# Patient Record
Sex: MALE | Race: WHITE | URBAN - METROPOLITAN AREA
[De-identification: names, ages, dates, MRNs, and addresses within clinical notes are randomized per-mention and may not be internally consistent; named-entity substitution may affect disease eponyms.]

---

## 2018-06-07 ENCOUNTER — PRE VISIT (OUTPATIENT)
Dept: ONCOLOGY | Facility: CLINIC | Age: 83
End: 2018-06-07

## 2018-06-07 NOTE — TELEPHONE ENCOUNTER
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

## 2018-06-11 ENCOUNTER — APPOINTMENT (OUTPATIENT)
Dept: GENERAL RADIOLOGY | Facility: CLINIC | Age: 83
DRG: 206 | End: 2018-06-11
Attending: EMERGENCY MEDICINE
Payer: MEDICARE

## 2018-06-11 ENCOUNTER — APPOINTMENT (OUTPATIENT)
Dept: CT IMAGING | Facility: CLINIC | Age: 83
DRG: 206 | End: 2018-06-11
Attending: EMERGENCY MEDICINE
Payer: MEDICARE

## 2018-06-11 ENCOUNTER — HOSPITAL ENCOUNTER (INPATIENT)
Facility: CLINIC | Age: 83
LOS: 1 days | Discharge: HOME | DRG: 206 | End: 2018-06-12
Attending: EMERGENCY MEDICINE | Admitting: INTERNAL MEDICINE
Payer: MEDICARE

## 2018-06-11 DIAGNOSIS — R55 NEAR SYNCOPE: ICD-10-CM

## 2018-06-11 DIAGNOSIS — J18.9 PNEUMONIA OF RIGHT UPPER LOBE DUE TO INFECTIOUS ORGANISM: ICD-10-CM

## 2018-06-11 DIAGNOSIS — I25.10 CORONARY ARTERY DISEASE INVOLVING NATIVE HEART WITHOUT ANGINA PECTORIS, UNSPECIFIED VESSEL OR LESION TYPE: ICD-10-CM

## 2018-06-11 PROBLEM — R06.02 SOB (SHORTNESS OF BREATH): Status: ACTIVE | Noted: 2018-06-11

## 2018-06-11 LAB
ALBUMIN SERPL-MCNC: 3.3 G/DL (ref 3.4–5)
ALBUMIN UR-MCNC: NEGATIVE MG/DL
ALP SERPL-CCNC: 106 U/L (ref 40–150)
ALT SERPL W P-5'-P-CCNC: 60 U/L (ref 0–70)
ANION GAP SERPL CALCULATED.3IONS-SCNC: 6 MMOL/L (ref 3–14)
APPEARANCE UR: CLEAR
AST SERPL W P-5'-P-CCNC: 56 U/L (ref 0–45)
BASOPHILS # BLD AUTO: 0 10E9/L (ref 0–0.2)
BASOPHILS NFR BLD AUTO: 0.1 %
BILIRUB SERPL-MCNC: 0.2 MG/DL (ref 0.2–1.3)
BILIRUB UR QL STRIP: NEGATIVE
BUN SERPL-MCNC: 22 MG/DL (ref 7–30)
CALCIUM SERPL-MCNC: 8.8 MG/DL (ref 8.5–10.1)
CHLORIDE SERPL-SCNC: 105 MMOL/L (ref 94–109)
CO2 SERPL-SCNC: 28 MMOL/L (ref 20–32)
COLOR UR AUTO: YELLOW
CREAT SERPL-MCNC: 1.05 MG/DL (ref 0.66–1.25)
CRP SERPL-MCNC: 36 MG/L (ref 0–8)
DIFFERENTIAL METHOD BLD: ABNORMAL
EOSINOPHIL # BLD AUTO: 0.1 10E9/L (ref 0–0.7)
EOSINOPHIL NFR BLD AUTO: 1.5 %
ERYTHROCYTE [DISTWIDTH] IN BLOOD BY AUTOMATED COUNT: 14.3 % (ref 10–15)
GFR SERPL CREATININE-BSD FRML MDRD: 68 ML/MIN/1.7M2
GLUCOSE SERPL-MCNC: 99 MG/DL (ref 70–99)
GLUCOSE UR STRIP-MCNC: NEGATIVE MG/DL
HCT VFR BLD AUTO: 33.5 % (ref 40–53)
HGB BLD-MCNC: 11.3 G/DL (ref 13.3–17.7)
HGB UR QL STRIP: NEGATIVE
IMM GRANULOCYTES # BLD: 0 10E9/L (ref 0–0.4)
IMM GRANULOCYTES NFR BLD: 0.1 %
KETONES UR STRIP-MCNC: NEGATIVE MG/DL
LEUKOCYTE ESTERASE UR QL STRIP: NEGATIVE
LYMPHOCYTES # BLD AUTO: 5.3 10E9/L (ref 0.8–5.3)
LYMPHOCYTES NFR BLD AUTO: 73.2 %
MCH RBC QN AUTO: 33.7 PG (ref 26.5–33)
MCHC RBC AUTO-ENTMCNC: 33.7 G/DL (ref 31.5–36.5)
MCV RBC AUTO: 100 FL (ref 78–100)
MONOCYTES # BLD AUTO: 0.3 10E9/L (ref 0–1.3)
MONOCYTES NFR BLD AUTO: 4.6 %
MUCOUS THREADS #/AREA URNS LPF: PRESENT /LPF
NEUTROPHILS # BLD AUTO: 1.5 10E9/L (ref 1.6–8.3)
NEUTROPHILS NFR BLD AUTO: 20.5 %
NITRATE UR QL: NEGATIVE
NRBC # BLD AUTO: 0 10*3/UL
NRBC BLD AUTO-RTO: 0 /100
NT-PROBNP SERPL-MCNC: 770 PG/ML (ref 0–1800)
PH UR STRIP: 5.5 PH (ref 5–7)
PLATELET # BLD AUTO: 150 10E9/L (ref 150–450)
PLATELET # BLD EST: ABNORMAL 10*3/UL
POTASSIUM SERPL-SCNC: 4 MMOL/L (ref 3.4–5.3)
PROCALCITONIN SERPL-MCNC: <0.05 NG/ML
PROT SERPL-MCNC: 6.6 G/DL (ref 6.8–8.8)
RBC # BLD AUTO: 3.35 10E12/L (ref 4.4–5.9)
RBC #/AREA URNS AUTO: 0 /HPF (ref 0–2)
SODIUM SERPL-SCNC: 139 MMOL/L (ref 133–144)
SOURCE: ABNORMAL
SP GR UR STRIP: 1.01 (ref 1–1.03)
TROPONIN I SERPL-MCNC: <0.015 UG/L (ref 0–0.04)
TSH SERPL DL<=0.005 MIU/L-ACNC: 1.95 MU/L (ref 0.4–4)
UROBILINOGEN UR STRIP-MCNC: NORMAL MG/DL (ref 0–2)
WBC # BLD AUTO: 7.2 10E9/L (ref 4–11)
WBC #/AREA URNS AUTO: <1 /HPF (ref 0–5)

## 2018-06-11 PROCEDURE — 84443 ASSAY THYROID STIM HORMONE: CPT | Performed by: EMERGENCY MEDICINE

## 2018-06-11 PROCEDURE — A9270 NON-COVERED ITEM OR SERVICE: HCPCS | Mod: GY | Performed by: STUDENT IN AN ORGANIZED HEALTH CARE EDUCATION/TRAINING PROGRAM

## 2018-06-11 PROCEDURE — 93005 ELECTROCARDIOGRAM TRACING: CPT | Performed by: EMERGENCY MEDICINE

## 2018-06-11 PROCEDURE — 82784 ASSAY IGA/IGD/IGG/IGM EACH: CPT | Performed by: EMERGENCY MEDICINE

## 2018-06-11 PROCEDURE — 85025 COMPLETE CBC W/AUTO DIFF WBC: CPT | Performed by: EMERGENCY MEDICINE

## 2018-06-11 PROCEDURE — 99285 EMERGENCY DEPT VISIT HI MDM: CPT | Mod: 25 | Performed by: EMERGENCY MEDICINE

## 2018-06-11 PROCEDURE — 80053 COMPREHEN METABOLIC PANEL: CPT | Performed by: EMERGENCY MEDICINE

## 2018-06-11 PROCEDURE — 25000128 H RX IP 250 OP 636: Performed by: RADIOLOGY

## 2018-06-11 PROCEDURE — 71260 CT THORAX DX C+: CPT

## 2018-06-11 PROCEDURE — 81001 URINALYSIS AUTO W/SCOPE: CPT | Performed by: EMERGENCY MEDICINE

## 2018-06-11 PROCEDURE — 99222 1ST HOSP IP/OBS MODERATE 55: CPT | Mod: GC | Performed by: INTERNAL MEDICINE

## 2018-06-11 PROCEDURE — 71046 X-RAY EXAM CHEST 2 VIEWS: CPT

## 2018-06-11 PROCEDURE — 84484 ASSAY OF TROPONIN QUANT: CPT | Performed by: EMERGENCY MEDICINE

## 2018-06-11 PROCEDURE — 25000128 H RX IP 250 OP 636: Performed by: STUDENT IN AN ORGANIZED HEALTH CARE EDUCATION/TRAINING PROGRAM

## 2018-06-11 PROCEDURE — 84145 PROCALCITONIN (PCT): CPT | Performed by: EMERGENCY MEDICINE

## 2018-06-11 PROCEDURE — 36415 COLL VENOUS BLD VENIPUNCTURE: CPT | Performed by: EMERGENCY MEDICINE

## 2018-06-11 PROCEDURE — 40000611 ZZHCL STATISTIC MORPHOLOGY W/INTERP HEMEPATH TC 85060: Performed by: STUDENT IN AN ORGANIZED HEALTH CARE EDUCATION/TRAINING PROGRAM

## 2018-06-11 PROCEDURE — 85045 AUTOMATED RETICULOCYTE COUNT: CPT | Performed by: EMERGENCY MEDICINE

## 2018-06-11 PROCEDURE — 83880 ASSAY OF NATRIURETIC PEPTIDE: CPT | Performed by: EMERGENCY MEDICINE

## 2018-06-11 PROCEDURE — 86738 MYCOPLASMA ANTIBODY: CPT | Performed by: EMERGENCY MEDICINE

## 2018-06-11 PROCEDURE — 21400006 ZZH R&B CCU INTERMEDIATE UMMC

## 2018-06-11 PROCEDURE — 93010 ELECTROCARDIOGRAM REPORT: CPT | Mod: Z6 | Performed by: EMERGENCY MEDICINE

## 2018-06-11 PROCEDURE — 85652 RBC SED RATE AUTOMATED: CPT | Performed by: EMERGENCY MEDICINE

## 2018-06-11 PROCEDURE — 86140 C-REACTIVE PROTEIN: CPT | Performed by: EMERGENCY MEDICINE

## 2018-06-11 PROCEDURE — 25000132 ZZH RX MED GY IP 250 OP 250 PS 637: Mod: GY | Performed by: STUDENT IN AN ORGANIZED HEALTH CARE EDUCATION/TRAINING PROGRAM

## 2018-06-11 RX ORDER — IOPAMIDOL 755 MG/ML
75 INJECTION, SOLUTION INTRAVASCULAR ONCE
Status: COMPLETED | OUTPATIENT
Start: 2018-06-11 | End: 2018-06-11

## 2018-06-11 RX ORDER — DIPHENHYDRAMINE HCL 25 MG
50 TABLET ORAL 2 TIMES DAILY
COMMUNITY

## 2018-06-11 RX ORDER — NALOXONE HYDROCHLORIDE 0.4 MG/ML
.1-.4 INJECTION, SOLUTION INTRAMUSCULAR; INTRAVENOUS; SUBCUTANEOUS
Status: DISCONTINUED | OUTPATIENT
Start: 2018-06-11 | End: 2018-06-12 | Stop reason: HOSPADM

## 2018-06-11 RX ORDER — UBIDECARENONE 100 MG
100 CAPSULE ORAL DAILY
COMMUNITY
Start: 2015-07-15

## 2018-06-11 RX ORDER — FUROSEMIDE 10 MG/ML
20 INJECTION INTRAMUSCULAR; INTRAVENOUS ONCE
Status: COMPLETED | OUTPATIENT
Start: 2018-06-11 | End: 2018-06-11

## 2018-06-11 RX ORDER — ATROPINE SULFATE 10 MG/ML
SOLUTION/ DROPS OPHTHALMIC
COMMUNITY

## 2018-06-11 RX ORDER — FUROSEMIDE 10 MG/ML
20 INJECTION INTRAMUSCULAR; INTRAVENOUS DAILY
Status: DISCONTINUED | OUTPATIENT
Start: 2018-06-12 | End: 2018-06-12

## 2018-06-11 RX ORDER — NITROGLYCERIN 0.4 MG/1
0.4 TABLET SUBLINGUAL EVERY 5 MIN PRN
COMMUNITY
Start: 2012-08-28

## 2018-06-11 RX ORDER — MAG HYDROX/ALUMINUM HYD/SIMETH 400-400-40
450 SUSPENSION, ORAL (FINAL DOSE FORM) ORAL DAILY
COMMUNITY
Start: 2011-04-14

## 2018-06-11 RX ORDER — MONTELUKAST SODIUM 10 MG/1
10 TABLET ORAL AT BEDTIME
Status: DISCONTINUED | OUTPATIENT
Start: 2018-06-11 | End: 2018-06-12 | Stop reason: HOSPADM

## 2018-06-11 RX ORDER — ASPIRIN 81 MG/1
81 TABLET, CHEWABLE ORAL DAILY
Status: DISCONTINUED | OUTPATIENT
Start: 2018-06-12 | End: 2018-06-12 | Stop reason: HOSPADM

## 2018-06-11 RX ORDER — DIPHENHYDRAMINE HCL 25 MG
25 CAPSULE ORAL AT BEDTIME
Status: DISCONTINUED | OUTPATIENT
Start: 2018-06-11 | End: 2018-06-12 | Stop reason: HOSPADM

## 2018-06-11 RX ORDER — MONTELUKAST SODIUM 10 MG/1
10 TABLET ORAL AT BEDTIME
COMMUNITY
Start: 2018-05-10

## 2018-06-11 RX ORDER — ALBUTEROL SULFATE 90 UG/1
1-2 AEROSOL, METERED RESPIRATORY (INHALATION) EVERY 4 HOURS PRN
COMMUNITY
Start: 2018-03-22

## 2018-06-11 RX ADMIN — IOPAMIDOL 75 ML: 755 INJECTION, SOLUTION INTRAVENOUS at 17:58

## 2018-06-11 RX ADMIN — DIPHENHYDRAMINE HYDROCHLORIDE 25 MG: 25 CAPSULE ORAL at 22:22

## 2018-06-11 RX ADMIN — MONTELUKAST SODIUM 10 MG: 10 TABLET, FILM COATED ORAL at 22:22

## 2018-06-11 RX ADMIN — FUROSEMIDE 20 MG: 10 INJECTION, SOLUTION INTRAVENOUS at 22:22

## 2018-06-11 ASSESSMENT — ACTIVITIES OF DAILY LIVING (ADL)
DRESS: 0-->INDEPENDENT
TOILETING: 0-->INDEPENDENT
RETIRED_EATING: 0-->INDEPENDENT
RETIRED_COMMUNICATION: 0-->UNDERSTANDS/COMMUNICATES WITHOUT DIFFICULTY
SWALLOWING: 0-->SWALLOWS FOODS/LIQUIDS WITHOUT DIFFICULTY
BATHING: 0-->INDEPENDENT

## 2018-06-11 ASSESSMENT — ENCOUNTER SYMPTOMS
FREQUENCY: 1
SHORTNESS OF BREATH: 1
PALPITATIONS: 1
APPETITE CHANGE: 1
NAUSEA: 0
FEVER: 0
SINUS PAIN: 1
FATIGUE: 1
VOICE CHANGE: 1
ABDOMINAL PAIN: 0
VOMITING: 0
LIGHT-HEADEDNESS: 1
COUGH: 1
BLOOD IN STOOL: 0

## 2018-06-11 NOTE — ED PROVIDER NOTES
[place] EMERGENCY DEPARTMENT ([facility])  6/11/18   History     Chief Complaint   Patient presents with     Shortness of Breath     The history is provided by the patient and medical records.     Duglas Odom is a 82 year old male with a medical history significant for hypertension, hyperlipidemia, s/p CABG ×4, atrial flutter, ASCVD and asthma with persistent cough since February of this year who presents with fatigue.  Patient has had a cough over the last 4 months which started when he was on vacation in [place].  Patient has been on 4 different types of antibiotics (known azithromycin and currently on augmentin, unclear other antibiotics).  Patient was initially started on antibiotic for 10 days and steroids while in [place]; however, he had no improvement of his symptoms.  He was then put on a Z-Edy at the end of March, but had no significant improvement.  Patient continues to have a nonproductive cough.  He is currently on Augmentin which he was instructed to take twice daily for 14-30 days until he was 100% improved.  Patient had a CT scan done 2-3 weeks ago that showed left lower lobe abnormality.  He denies fever but does have ongoing post-nasal drip, sore throat and intermittent hoarse voice. Minimal sinus pain or pressure.     Patient also has had worsening energy levels and states that he is not able to do all of the normal activities he can usually do in a day.  Patient reports that his fatigue has been worsening over the past month.  Yesterday, the patient went dancing and returned home to finish mowing the lawn.  The patient's neighbor came over as the patient looked significantly pale with cyanotic lips and looked as if he was going to lose consciousness.  This triggered the family to have the patient be evaluated.  He reports mild shortness of breath for the last few months.  He has had no appetite.  The patient reports that his weight is down only a couple of pounds.  Patient is  able to lay flat at night without PND or orthopnea.  He denies any chest pain.  Patient has frequent nightly urination, no dysuria.  Patient denies being on any diuretics and denies any history of prostate issues.  Patient denies any leg swelling, abdominal pain, nausea, vomiting, blood in the stool or recent falls.  He also denies any positive sick contacts or recent travel besides the trip to [place] 4 months ago.      Patient states that in October 2017 he had a blood test performed by his PCP that showed concern for CLL.  Patient was sent to a hematologist at that time, he had the blood test repeated and it had improved.  Patient was told by his hematologist to return in 3 months to have a repeat blood test.      I have reviewed the Medications, Allergies, Past Medical and Surgical History, and Social History in the [employer] system.    Past Medical History:   Diagnosis Date     Allergic state      Uncomplicated asthma        History reviewed. No pertinent surgical history.    No family history on file.    Social History   Substance Use Topics     Smoking status: Not on file     Smokeless tobacco: Not on file     Alcohol use Not on file       No current facility-administered medications for this encounter.      Current Outpatient Prescriptions   Medication     albuterol (PROAIR HFA/PROVENTIL HFA/VENTOLIN HFA) 108 (90 Base) MCG/ACT Inhaler     amoxicillin-clavulanate (AUGMENTIN) 875-125 MG per tablet     co-enzyme Q-10 100 MG CAPS capsule     montelukast (SINGULAIR) 10 MG tablet     nitroGLYcerin (NITROSTAT) 0.4 MG sublingual tablet     saw palmetto 450 MG CAPS capsule     study - aspirin vs placebo (IDS #5239) 1 tablet tablet     atropine 1 % ophthalmic solution     diphenhydrAMINE (BENADRYL) 25 MG tablet        Allergies   Allergen Reactions     Bee Pollen Anaphylaxis     Penicillins Nausea and Vomiting     Acetaminophen Unknown     Per pharmacy     Aztreonam Unknown     Per pharmacy     Cephalosporins Unknown      Per pharmacy     Morphine Unknown     Per pharmacy     Oxycodone-Aspirin GI Disturbance     Propoxyphene N-Apap Nausea     Sulfamethoxazole-Trimethoprim Nausea and Vomiting         Review of Systems   Constitutional: Positive for appetite change (loss) and fatigue. Negative for fever.   HENT: Positive for sinus pain (bilateral below the eyes) and voice change (hoarse).    Respiratory: Positive for cough and shortness of breath (mild).    Cardiovascular: Positive for palpitations (intermittent racing). Negative for chest pain and leg swelling.   Gastrointestinal: Negative for abdominal pain, blood in stool, nausea and vomiting.   Genitourinary: Positive for frequency.   Neurological: Positive for light-headedness.   All other systems reviewed and are negative.      Physical Exam     ED Triage Vitals   Enc Vitals Group      BP 06/11/18 1504 135/66      Pulse 06/11/18 1504 54      Resp 06/11/18 1504 16      Temp 06/11/18 1504 97.7  F (36.5  C)      Temp src 06/11/18 1504 Oral      SpO2 06/11/18 1504 100 %      Weight 06/11/18 1504 68.9 kg (152 lb)       Physical Exam   General: patient is alert and oriented and in no acute distress   Head: atraumatic and normocephalic   EENT: tacky mucus membranes without tonsillar erythema or exudates, pupils equal round and reactive, sclera anicteric, no pallor of conjunctiva  Neck: supple   Cardiovascular: regular rate and rhythm, no murmur appreciated, extremities warm and well perfused, trace bilateral lower extremity edema  Pulmonary: lungs clear to auscultation bilaterally   Abdomen: soft, non-tender   Musculoskeletal: normal range of motion   Neurological: alert and oriented, moving all extremities symmetrically, gait normal   Skin: warm, dry       ED Course   3:18 PM  The patient was seen and examined by Pippa Gillespie MD in Room ED19.     ED Course     Procedures             EKG Interpretation:      Interpreted by Pippa Gillespie  Time reviewed: 1550  Symptoms at time of  EKG: fatigue   Rhythm: 1 degree AV block  Rate: Bradycardia  Axis: Normal  Ectopy: none  Conduction: 1st degree AV block  ST Segments/ T Waves: No ST-T wave changes  Q Waves: none  Comparison to prior: No old EKG available    Clinical Impression: 1st degree AV block                Critical Care time:  none             Labs Ordered and Resulted from Time of ED Arrival Up to the Time of Departure from the ED - No data to display         Assessments & Plan (with Medical Decision Making)     Mr. Odom is an 82-year-old male with a history of coronary artery disease status post quadruple bypass, hypertension, hyperlipidemia, allergic rhinitis, asthma and prolonged cough who presents with ongoing cough, progressive fatigue and exercise intolerance and near syncopal episode.  In the Emergency Department he is hemodynamically stable and afebrile.  He is in no respiratory distress and speaking in full sentences.      At this point the differential diagnosis is quite broad given his generalized symptoms that have been present for a number of months with more recent acute worsening.  Differential includes but is not limited to anemia, progressive leukemia, worsening coronary artery disease, congestive heart failure, ongoing pulmonary infection including bacterial pneumonia versus fungal infection versus postobstructive pneumonia, electrolyte derangement, thyroid dysfunction.  Labs were obtained including CBC, CMP, troponin, BNP and TSH.  He is mildly anemic with a hemoglobin of 11.3.  He has no leukocytosis.  Electrolytes and renal function are within normal limits.  TSH is within normal limits.  His troponin is negative.  BNP is elevated to 770.  UA negative.  EKG shows first-degree AV block without ischemic changes.  Chest x-ray shows hazy right upper lobe infiltrate.  He did go for a CT of the chest to further evaluate this area which shows groundglass opacities as well as multiple prominent lymph nodes and right hilar  adenopathy versus mass.  In addition he has cardiomegaly and reflux of contrast suggestive of underlying CHF as well as signs of pulmonary hypertension.  Given his known coronary artery disease he will be admitted to cardiology for echo and stress testing.  He will require pulmonary consultation at some point as well for possible bronchoscopy and biopsy due to ongoing pulmonary symptoms and CT findings.    This part of the document was transcribed by Geri Nieto, Medical Scribe.      I have reviewed the nursing notes.    I have reviewed the findings, diagnosis, plan and need for follow up with the patient.    Current Discharge Medication List          Final diagnoses:   Near syncope   Pneumonia of right upper lobe due to infectious organism (H)   Coronary artery disease involving native heart without angina pectoris, unspecified vessel or lesion type     I, Cirilo Carranza, am serving as a trained medical scribe to document services personally performed by Pippa Gillespie MD, based on the provider's statements to me.   I, Pippa Gillespie MD, was physically present and have reviewed and verified the accuracy of this note documented by Cirilo Carranza.    6/11/2018   [facility], [place], EMERGENCY DEPARTMENT     Pippa Gillespie MD  06/11/18 6391

## 2018-06-11 NOTE — ED TRIAGE NOTES
"Pt comes in for evaluation of ongoing fatigue, bronchitis, cough, and weakness. Is on his 4th antibiotic for bronchitis, says he is feeling even more fatigued since starting the last antibiotic 10 days ago. Also mentioned that his doctor told him he had \"borderline leukemia\" and would like a second opinion. Per daughter, pt was outside working yesterday when he became very sob, dizzy, and almost fainted--prompting trip to the ER today. Alert and oriented, vss.   "

## 2018-06-12 ENCOUNTER — APPOINTMENT (OUTPATIENT)
Dept: CARDIOLOGY | Facility: CLINIC | Age: 83
DRG: 206 | End: 2018-06-12
Payer: MEDICARE

## 2018-06-12 VITALS
WEIGHT: 144.2 LBS | TEMPERATURE: 97.3 F | DIASTOLIC BLOOD PRESSURE: 56 MMHG | HEART RATE: 55 BPM | RESPIRATION RATE: 18 BRPM | OXYGEN SATURATION: 93 % | HEIGHT: 66 IN | SYSTOLIC BLOOD PRESSURE: 102 MMHG | BODY MASS INDEX: 23.18 KG/M2

## 2018-06-12 DIAGNOSIS — R05.9 COUGH: ICD-10-CM

## 2018-06-12 DIAGNOSIS — R93.89 ABNORMAL CHEST CT: Primary | ICD-10-CM

## 2018-06-12 LAB
ANION GAP SERPL CALCULATED.3IONS-SCNC: 6 MMOL/L (ref 3–14)
BASOPHILS # BLD AUTO: 0 10E9/L (ref 0–0.2)
BASOPHILS # BLD AUTO: 0 10E9/L (ref 0–0.2)
BASOPHILS NFR BLD AUTO: 0.1 %
BASOPHILS NFR BLD AUTO: 0.2 %
BUN SERPL-MCNC: 17 MG/DL (ref 7–30)
CALCIUM SERPL-MCNC: 8.4 MG/DL (ref 8.5–10.1)
CHLORIDE SERPL-SCNC: 106 MMOL/L (ref 94–109)
CHOLEST SERPL-MCNC: 96 MG/DL
CO2 SERPL-SCNC: 28 MMOL/L (ref 20–32)
COPATH REPORT: NORMAL
CREAT SERPL-MCNC: 0.98 MG/DL (ref 0.66–1.25)
DIFFERENTIAL METHOD BLD: ABNORMAL
DIFFERENTIAL METHOD BLD: ABNORMAL
EOSINOPHIL # BLD AUTO: 0.1 10E9/L (ref 0–0.7)
EOSINOPHIL # BLD AUTO: 0.1 10E9/L (ref 0–0.7)
EOSINOPHIL NFR BLD AUTO: 1.6 %
EOSINOPHIL NFR BLD AUTO: 1.8 %
ERYTHROCYTE [DISTWIDTH] IN BLOOD BY AUTOMATED COUNT: 14.2 % (ref 10–15)
ERYTHROCYTE [DISTWIDTH] IN BLOOD BY AUTOMATED COUNT: 14.3 % (ref 10–15)
ERYTHROCYTE [SEDIMENTATION RATE] IN BLOOD BY WESTERGREN METHOD: 42 MM/H (ref 0–20)
GFR SERPL CREATININE-BSD FRML MDRD: 74 ML/MIN/1.7M2
GLUCOSE SERPL-MCNC: 91 MG/DL (ref 70–99)
HCT VFR BLD AUTO: 32.1 % (ref 40–53)
HCT VFR BLD AUTO: 32.8 % (ref 40–53)
HDLC SERPL-MCNC: 26 MG/DL
HGB BLD-MCNC: 10.8 G/DL (ref 13.3–17.7)
HGB BLD-MCNC: 11 G/DL (ref 13.3–17.7)
IGG SERPL-MCNC: 740 MG/DL (ref 695–1620)
IMM GRANULOCYTES # BLD: 0 10E9/L (ref 0–0.4)
IMM GRANULOCYTES # BLD: 0 10E9/L (ref 0–0.4)
IMM GRANULOCYTES NFR BLD: 0 %
IMM GRANULOCYTES NFR BLD: 0.2 %
INTERPRETATION ECG - MUSE: NORMAL
LDH SERPL L TO P-CCNC: 177 U/L (ref 85–227)
LDLC SERPL CALC-MCNC: 56 MG/DL
LYMPHOCYTES # BLD AUTO: 4.5 10E9/L (ref 0.8–5.3)
LYMPHOCYTES # BLD AUTO: 6.2 10E9/L (ref 0.8–5.3)
LYMPHOCYTES NFR BLD AUTO: 73.2 %
LYMPHOCYTES NFR BLD AUTO: 81.5 %
MCH RBC QN AUTO: 33.1 PG (ref 26.5–33)
MCH RBC QN AUTO: 33.4 PG (ref 26.5–33)
MCHC RBC AUTO-ENTMCNC: 33.5 G/DL (ref 31.5–36.5)
MCHC RBC AUTO-ENTMCNC: 33.6 G/DL (ref 31.5–36.5)
MCV RBC AUTO: 99 FL (ref 78–100)
MCV RBC AUTO: 99 FL (ref 78–100)
MONOCYTES # BLD AUTO: 0 10E9/L (ref 0–1.3)
MONOCYTES # BLD AUTO: 0.3 10E9/L (ref 0–1.3)
MONOCYTES NFR BLD AUTO: 0.1 %
MONOCYTES NFR BLD AUTO: 4.7 %
NEUTROPHILS # BLD AUTO: 1.2 10E9/L (ref 1.6–8.3)
NEUTROPHILS # BLD AUTO: 1.3 10E9/L (ref 1.6–8.3)
NEUTROPHILS NFR BLD AUTO: 16.7 %
NEUTROPHILS NFR BLD AUTO: 19.9 %
NONHDLC SERPL-MCNC: 70 MG/DL
NRBC # BLD AUTO: 0 10*3/UL
NRBC # BLD AUTO: 0 10*3/UL
NRBC BLD AUTO-RTO: 0 /100
NRBC BLD AUTO-RTO: 0 /100
OVALOCYTES BLD QL SMEAR: SLIGHT
PLATELET # BLD AUTO: 153 10E9/L (ref 150–450)
PLATELET # BLD AUTO: 163 10E9/L (ref 150–450)
PLATELET # BLD EST: ABNORMAL 10*3/UL
POIKILOCYTOSIS BLD QL SMEAR: SLIGHT
POTASSIUM SERPL-SCNC: 4.1 MMOL/L (ref 3.4–5.3)
RBC # BLD AUTO: 3.23 10E12/L (ref 4.4–5.9)
RBC # BLD AUTO: 3.32 10E12/L (ref 4.4–5.9)
RETICS # AUTO: 34.9 10E9/L (ref 25–95)
RETICS # AUTO: 38.5 10E9/L (ref 25–95)
RETICS/RBC NFR AUTO: 1.1 % (ref 0.5–2)
RETICS/RBC NFR AUTO: 1.2 % (ref 0.5–2)
SODIUM SERPL-SCNC: 140 MMOL/L (ref 133–144)
TRIGL SERPL-MCNC: 72 MG/DL
WBC # BLD AUTO: 6.1 10E9/L (ref 4–11)
WBC # BLD AUTO: 7.6 10E9/L (ref 4–11)

## 2018-06-12 PROCEDURE — 85045 AUTOMATED RETICULOCYTE COUNT: CPT | Performed by: INTERNAL MEDICINE

## 2018-06-12 PROCEDURE — 25000132 ZZH RX MED GY IP 250 OP 250 PS 637: Mod: GY | Performed by: STUDENT IN AN ORGANIZED HEALTH CARE EDUCATION/TRAINING PROGRAM

## 2018-06-12 PROCEDURE — 36415 COLL VENOUS BLD VENIPUNCTURE: CPT | Performed by: INTERNAL MEDICINE

## 2018-06-12 PROCEDURE — 83615 LACTATE (LD) (LDH) ENZYME: CPT | Performed by: INTERNAL MEDICINE

## 2018-06-12 PROCEDURE — 99238 HOSP IP/OBS DSCHRG MGMT 30/<: CPT | Mod: 25 | Performed by: INTERNAL MEDICINE

## 2018-06-12 PROCEDURE — 93306 TTE W/DOPPLER COMPLETE: CPT | Mod: 26 | Performed by: INTERNAL MEDICINE

## 2018-06-12 PROCEDURE — A9270 NON-COVERED ITEM OR SERVICE: HCPCS | Mod: GY | Performed by: STUDENT IN AN ORGANIZED HEALTH CARE EDUCATION/TRAINING PROGRAM

## 2018-06-12 PROCEDURE — 80061 LIPID PANEL: CPT | Performed by: INTERNAL MEDICINE

## 2018-06-12 PROCEDURE — 80048 BASIC METABOLIC PNL TOTAL CA: CPT | Performed by: INTERNAL MEDICINE

## 2018-06-12 PROCEDURE — 25500064 ZZH RX 255 OP 636: Performed by: INTERNAL MEDICINE

## 2018-06-12 PROCEDURE — 25000128 H RX IP 250 OP 636: Performed by: STUDENT IN AN ORGANIZED HEALTH CARE EDUCATION/TRAINING PROGRAM

## 2018-06-12 PROCEDURE — 87899 AGENT NOS ASSAY W/OPTIC: CPT | Performed by: STUDENT IN AN ORGANIZED HEALTH CARE EDUCATION/TRAINING PROGRAM

## 2018-06-12 PROCEDURE — 85025 COMPLETE CBC W/AUTO DIFF WBC: CPT | Performed by: INTERNAL MEDICINE

## 2018-06-12 RX ADMIN — HUMAN ALBUMIN MICROSPHERES AND PERFLUTREN 6 ML: 10; .22 INJECTION, SOLUTION INTRAVENOUS at 11:00

## 2018-06-12 RX ADMIN — ASPIRIN 81 MG CHEWABLE TABLET 81 MG: 81 TABLET CHEWABLE at 08:59

## 2018-06-12 RX ADMIN — FUROSEMIDE 20 MG: 10 INJECTION, SOLUTION INTRAVENOUS at 08:57

## 2018-06-12 NOTE — DISCHARGE SUMMARY
Cardiology 1 Service Discharge Summary  Duglas Odom MRN: [id number]  1935    Primary care provider: System, Provider Not In  ___________________________________          Date of Admission:  6/11/2018  Date of Discharge:  6/12/2018  Admitting Physician:  Mandy Valdovinos MD  Discharge Physician:  Mandy Valdovinos MD  Discharging Service:  Cardiology 1 Service     Primary Provider: System, Provider Not In    ---- FOLLOW UP --------  PCP  Outside oncologist to continue further workup of lymphadenopathy  Pulmonology clinic for further evaluation and possible transbronchial biopsy    -------------------------------------------------------------------------------------------         Pending Results:   - Legionella Uag / Mycoplasma serology / AFB culture   - Peripheral blood smear            Reason for Admission:   Shortness of breath          Discharge Diagnosis:   Shortness of breath 2/2 unclear etiology. 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 No regional wall motion abnormalities are seen. Grade  II or moderate diastolic dysfunction.  RV is difficult to assess but appears with normal size and likely normal  function  Severe aortic valve sclerosis is present. Trace to mild aortic insufficiency  is present. Mild to moderate tricuspid insufficiency is present.  The inferior vena cava was normal in size with preserved respiratory  variability.  No pericardial effusion is present.     Previous study not available for comparison.    CT chest w/ contrast 6/11  1. Groundglass opacities most notable in the  right upper lobe and  middle lobe with bronchial wall thickening and enlarged mediastinal  and right hilar lymph nodes. Differential includes typical or atypical  infection or less likely pulmonary infiltration of CLL. Consider short  term follow-up CT to assess for improvement of right hilar  adenopathy/masses.   2. Multiple prominent mediastinal, retropectoral and axillary lymph  nodes, likely due to  CLL.  3. Previously described left lower lobe nodular and infiltration on  the OSH CT report dated 4/6/2018, are resolved.  4. The right main pulmonary artery measuring up to 3.3 cm, the left  main pulmonary artery measuring up to 2.9 cm. These are suggestive of  pulmonary hypertension.           Consultations:   Pulmonology service         Hospital Course by Problem:    Duglas Odom is a 82 year old with PMH that is pertinent for HTN, HLD , CAD s/p CABG (4v 2011) , PCI x2 (2004) , paroxysmal atrial fibrillation ?(not on anticoagulation) who is presenting with 3 months history of worsening cough and SOB.      #Subacute cough/SOB s/p multiple reported Abx and prednisone courses outpatient  #Mediastinal lymphadenopathy  #Diffuse GGO   Initial concern for HF given what appeared to be elevated JVP and mildly elevated BNP but patient endorses months of progressive cough and shortness of breath s/p multiple failed antibiotic regimens and prednisone. Also has new findings on CT this admission of retropectoral, mediastinal and axillary LN which are concerning for a primary pulmonary pathology. Broad differential so pulmonary team consulted who recommended outpatient evaluation with possible transbronchial biopsy. As part of cardiac workup, a TTE was done which was significant for moderate TR. Oncology was curbsided who thought continuity of care was best served by having patient keep previously scheduled appointment with OP oncologist. Patient insisted on leaving the hospital on 6/12 and so was discharged to follow up  "with PCP, Pulmonologist and outside oncologist. Patient left before he could be told about our recommendation for a stress Echo.       #CAD s/p 4v CABG 2011 (venous graft to LAD)   No history of active chest pain however given decreased exercise tolerance and history of venous graft for CABG plan was to obtain stress echocardiogram in the AM.     #Paroxysmal A.fib (CHADSVASC score 4)  Documented in several notes including cardiology notes from OSH. 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 He saw an oncologist on 4/2 who thought this could be related to resolving infection. He is scheduled to see the oncologist again next week to follow up     Physical Exam on day of Discharge:  Blood pressure 102/56, pulse 55, temperature 97.3  F (36.3  C), temperature source Oral, resp. rate 18, height 1.676 m (5' 6\"), weight 65.4 kg (144 lb 3.2 oz), SpO2 93 %.  General: Patient lying comfortably in bed, NAD  HEENT: EOMI, PERRL, no scleral icterus or injection, no bruits appreciated, no JVD appreciated. Hyperactive neck 2/2 TR  Cardiac: RRR, 3/6 murmur in aortic area. No rubs or gallops appreciated.  Respiratory: CTAB, no wheezes, rhonchi. Mild crackles appreciated  GI: NABS, NT/ND, no guarding or rebound  Extremities: No LE edema, pulses DP 2+, radial pulses 2+  Skin: No acute lesions appreciated  Neuro: AOx3, CN II-XII grossly intact, moving extremities normally         Discharge Medications:     Current Discharge Medication List      CONTINUE these medications which have NOT CHANGED    Details   albuterol (PROAIR HFA/PROVENTIL HFA/VENTOLIN HFA) 108 (90 Base) MCG/ACT Inhaler Inhale 1-2 puffs into the lungs every 4 hours as needed "       amoxicillin-clavulanate (AUGMENTIN) 875-125 MG per tablet Take 1 tablet by mouth 2 times daily       ASPIRIN PO Take 81 mg by mouth daily      atropine 1 % ophthalmic solution Place 1 drop into both eyes once daily if needed.      co-enzyme Q-10 100 MG CAPS capsule Take 100 mg by mouth daily       diphenhydrAMINE (BENADRYL) 25 MG tablet Take 50 mg by mouth 2 times daily       montelukast (SINGULAIR) 10 MG tablet Take 10 mg by mouth At Bedtime       saw palmetto 450 MG CAPS capsule Take 450 mg by mouth daily       nitroGLYcerin (NITROSTAT) 0.4 MG sublingual tablet Place 0.4 mg under the tongue every 5 minutes as needed                   Discharge Instructions and Follow-Up:     Discharge Procedure Orders  Reason for your hospital stay   Order Comments: Shortness of breath and cough     [facility] Follow-up and recommended labs and tests   Order Comments: Follow up with Pulmonology clinic at [facility] in next few weeks for possible transbronchial biopsy  for hospital follow- up. The following labs/tests are recommended: None.    Appointments on [place] and/or [facility] (with [facility] provider or service). Call [phone] if you haven't heard regarding these appointments within 7 days of discharge.     Follow Up and recommended labs and tests   Order Comments: Follow up with primary care provider, Provider Not In System, within 7 days for hospital follow- up.  No follow up labs or test are needed.    Follow up with your Oncologist within 1-2 weeks for further evaluation of new lymphadenopathy. The following labs/tests are recommended: Lymph node biopsy.     Activity   Order Comments: Your activity upon discharge: activity as tolerated   Order Specific Question Answer Comments   Is discharge order? Yes      Discharge Instructions   Order Comments: Please be sure to follow up with your oncologist for further workup of this new lymphadenopathy. Also the pulmonology clinic will be in contact with you to  schedule further evaluation with a transbronchial biopsy.     DNR (Do Not Resuscitate)     Diet   Order Comments: Follow this diet upon discharge: Orders Placed This Encounter     Low Saturated Fat Na <2400 mg   Order Specific Question Answer Comments   Is discharge order? Yes             Discharge Disposition:   Home with follow up as above         Condition on Discharge:   Discharge condition: Stable   Code status on discharge: Full Code        Date of service: 6/12/2018    The patient was discussed with Dr. Valdovinos.     [facility]  Cardiology service  PGY-2 Internal Medicine  Pager: [phone]    Staff Physician Comments:     I personally interviewed and examined Duglas Odom today, and agree with cardiology fellows/residents assessment and plan as documented above.        Mandy Valdovinos MD     Division of Cardiology  [facility] [place] [id number]    Clinic nurse:  Shahzad Watters LPN   Nurse Care Coordinator- Heart Care   [phone] option 1, than option 3    [phone] Appointments  [phone] Fax  [phone] After hours

## 2018-06-12 NOTE — PROGRESS NOTES
"SPIRITUAL HEALTH SERVICES  SPIRITUAL ASSESSMENT Progress Note  [facility] ([place]) 6C     PRIMARY FOCUS:     Emotional/spiritual/Presybeterian distress    Support for coping    REFERRAL SOURCE: Epic Consult     ILLNESS CIRCUMSTANCES:   Reviewed documentation. Reflective conversation shared with Pt and daughter which integrated elements of illness and family narratives.     Context of Serious Illness/Symptom(s) - Pt shared that there was a small tumor \"the size of his the top of thumb\" on his heart. Pt shared that he hoped that it would be removed without a lot of testing so he could return home.    Resources for Support - daughter    DISTRESS:     Emotional/Spiritual/Existential Distress - Not discussed    Sabianism Distress - Not discussed    Social/Cultural/Economic Distress - Pt expressed that he had projects at home that needed attention but that his health needed to come first.     SPIRITUAL/Faith COPING:     Religious/Kristie - pt shared that he was Pentecostalism but that he was comfortable with many traditions \"We all have the same God in the end\".     Spiritual Practice(s) - Not discussed    Emotional/Relational/Existential Connections - pt used humor to refect on his life.     GOALS OF CARE:    Goals of Care - Pt shared that he would like to be \"health enough to water ski on his 83st birthday in August\".     Meaning/Sense-Making - Not discussed    PLAN: Visit pt 1 or 2 times a week for duration of stay.    Alesia Morgan   Intern  Pager [phone]    "

## 2018-06-12 NOTE — PLAN OF CARE
Problem: Patient Care Overview  Goal: Plan of Care/Patient Progress Review  Outcome: No Change  Neuro: A&Ox4.   Cardiac: SR. Javad. Other vitals stable   Respiratory: Sating 95% on RA.  GI/: Adequate urine output. BM X1  Diet/appetite: Tolerating regular diet. Eating well.  Activity:  Independent   Pain: denies.   Skin: No new deficits noted.  LDA's: piv saline locked    Plan: Continue with POC. Notify primary team with changes. Possible d/c home and will have Bronchoscopy outpatient.

## 2018-06-12 NOTE — PROGRESS NOTES
CARDIOLOGY 1 SERVICE PROGRESS NOTE    Duglas Odom ([id number]) admitted on 6/11/2018 06/12/2018    Changes today:  - TTE today  - Pulmonology consult given suspicion of primary lung pathology (subacute progression of cough and dyspnea s/p multiple failed outpatient tx with steroids and antibiotics and new CT findings of GGO in RUL and RML with diffuse LAD)  - Oncology consult - Per oncology service, patient best served with continuity of care by OP oncologist  - Consider IR biopsy of axillary LN this admission vs outpatient.         Assessment and Plan:   Duglas Odom is a 82 year old with PMH that is pertinent for HTN, HLD , CAD s/p CABG (4v 2011) , PCI x2 (2004) , paroxysmal atrial fibrillation ?(not on anticoagulation) who is presenting with 3 months history of worsening cough and SOB.     #Subacute cough/SOB s/p multiple reported Abx and prednisone courses outpatient  #Mediastinal lymphadenopathy  #Diffuse GGO   Initial concern for HF given what appeared to be elevated JVP and mildly elevated BNP but patient endorses months of progressive cough and shortness of breath s/p multiple failed antibiotic regimens and prednisone. Also has new findings on CT this admission of retropectoral, mediastinal and axillary LN which are concerning for a primary pulmonary pathology.     DDx Infectious : Atypical lung infection (Legionella, Mycoplasma, NTMB) however no fevers, no wbc , and negative pro-calcitonin. Could be resolving infection.   Pulmonary : NSIP , HSP , ABPA (history of mold contact).   Malignant : CLL , lymphoma     - s/p Lasix 20 mg IV  - TTE  - Pulmonology consult for above.   - Oncology consult - Update: Oncology think continuity of care is best served by having patient keep previously scheduled appointment with OP oncologist  - Legionella Uag / Mycoplasma serology / AFB culture   - Peripheral blood smear   - Will need follow up CT in 3-6 months (consider outpatient pulm consult)      #CAD s/p 4v  "CABG 2011 (venous graft)   No history of active chest pain however given decreased exercise tolerance and history of venous graft for CABG will obtain stress echocardiogram in the AM.      - Aspirin 81 mg qday    - BB : Start BBlocker in AM  - Statin: AM lipid panel to calculate ASCVD      #Paroxysmal A.fib (CHADSVASC score 4)  Documented in several notes including cardiology notes from OSH. 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   He saw an oncologist on 4/2 who thought this could be related to resolving infection.      - Repeat PBS      #Normocytic (borderline macrocytic) Anemia , Chronic   At baseline , unclear etiology. DDx MDS , combined iron and b12 deficiency   - PBS / Reticulocyte count  - Consider further work up b12, iron , folate , MMA , etc...     #HTN : Not on BB medication - monitor inpatient may need ACE/CCB   #HLD: Lipid panel in the AM   #Asthma/Hives:  Continue PTA Benadryl / singular      Prophylaxis:  DVT: Ambulate GI: None   Family: Not updated  Disposition: Pending work up 1-3 days   Code Status: DNR - okay with intubation.     Patient discussed with Dr. Valdovinos who agrees with the plan      [facility]  Cardiology 1 service   PGY-2 Internal Medicine  Pager: [phone]    ==================================================================    Subjective:  Patient seen this AM. Has no acute concerns or complaints. Has some mild SOB but no chest pain, lightheadedness or palpitations.    Objective:  Most recent vital signs:  /71 (BP Location: Left arm)  Temp 97.5  F (36.4  C) (Oral)  Resp 18  Ht 1.676 m (5' 6\")  Wt 65.4 kg (144 lb 3.2 oz)  SpO2 96%  BMI 23.27 kg/m2  Temp:  [97.3  F (36.3 "  C)-97.8  F (36.6  C)] 97.5  F (36.4  C)  Heart Rate:  [54-60] 54  Resp:  [16-20] 18  BP: (123-165)/(66-99) 132/71  SpO2:  [94 %-100 %] 96 %  Wt Readings from Last 2 Encounters:   06/12/18 65.4 kg (144 lb 3.2 oz)       Intake/Output Summary (Last 24 hours) at 06/12/18 0927  Last data filed at 06/12/18 0927   Gross per 24 hour   Intake                0 ml   Output             1205 ml   Net            -1205 ml       Physical exam:  General: Patient lying comfortably in bed, NAD  HEENT: EOMI, PERRL, no scleral icterus or injection, no bruits appreciated, no JVD appreciated. Hyperactive neck 2/2 TR  Cardiac: RRR, 3/6 murmur in aortic area. No rubs or gallops appreciated.  Respiratory: CTAB, no wheezes, rhonchi. Mild crackles appreciated  GI: NABS, NT/ND, no guarding or rebound  Extremities: No LE edema, pulses DP 2+, radial pulses 2+  Skin: No acute lesions appreciated  Neuro: AOx3, CN II-XII grossly intact, moving extremities normally    Labs (Past three days):  Reviewed    Imaging/procedure results:   TTE 6/12/2018  Interpretation Summary  Left ventricular function, chamber size, wall motion, and wall thickness are  normal.The EF is 60-65%. No regional wall motion abnormalities are seen. Grade  II or moderate diastolic dysfunction.  RV is difficult to assess but appears with normal size and likely normal  function  Severe aortic valve sclerosis is present. Trace to mild aortic insufficiency  is present. Mild to moderate tricuspid insufficiency is present.  The inferior vena cava was normal in size with preserved respiratory  variability.  No pericardial effusion is present.     Previous study not available for comparison.    Staff Physician Comments:     I personally interviewed and examined Duglas Odom today, and agree with cardiology fellows/residents assessment and plan as documented above. Please see H&P dated 6/11/18. Acute worsening in symptoms possible cardiac but also with abnormal CT with prominent  infiltrate. Unable to detect lymphadenopathy on exam. Patient eager to leave and could have outpatient workup.       Mandy Valdovinos MD     Division of Cardiology  [facility] [address] [address]    Clinic nurse:  Shahzad Watters LPN   Nurse Care Coordinator- Heart Care   [phone] option 1, than option 3    [phone] Appointments  [phone] Fax  [phone] After hours

## 2018-06-12 NOTE — DISCHARGE INSTRUCTIONS
Bronchoscopy - What You Should Know    Your exam appointment is on *** (date)  at *** (time).  Please arrive 1 (one) hour prior to your exam appointment.  Endoscopy suite (1st floor, which is one level below main entrance) of [facility] at [address] [facility], [place], [place]  Please call [phone] if you need to cancel or have additional questions.    parking is available at the hospital or you can park in the Patient and Visitor Ramp on [facility], between [place] and [facility].    What is a bronchoscopy?  This exam uses a lighted tube to look at the main airways of the lungs. We insert a thin, flexible tube through your nose or mouth, past your windpipe and into your lungs. We will take a fluid or tissue sample if needed.     How do I get ready?  Please bring an adult who can drive you home after your exam. You will receive medications to sedate you (help you relax). You will not be able to drive for 12 hours.     Follow these guidelines for taking your medications:   -Stop aspirin for 5 days before procedure    - The day before your test (or as directed by your doctor): Stop taking Advil (ibuprofen), Aleve (naprosyn) or other over-the-counter pain killers. Tylenol (acetaminophen) is okay.     - If you have diabetes: on the morning of treatment, do not take insulin or other diabetes medicine (unless your doctor tells you to).    - Bring a list of your medicines and their doses to the exam.    Stop all food and drink, including water 6 hours before the exam.

## 2018-06-12 NOTE — CONSULTS
[facility]    Pulmonary, Allergy, Critical Care and Sleep Medicine    Initial Consultation  06/12/2018    Duglas Odom MRN# [id number]   Age: 82 year old YOB: 1935     Date of Admission: 6/11/2018  Reason for Consultation: Chronic cough   Requesting Team: Cards 1     Primary care provider: System, Provider Not In     Assessment and Recommendations:    Duglas Odom is a 82 year old male with a history of hypertension, coronary artery disease, CHF with admitted for severe bouts of cough and near passing out experience.  Patient has chronic cough for the last 4 months after he returns from [place].  No signs of infection although he has been treated multiple times for bronchitis with antibiotics and steroids.  Currently he is on Augmentin since January 1 prescribed by his PCP.  Patient's CT scan shows right upper lobe opacities concerning for infectious versus inflammatory process.    1.  Chronic cough with decreased exercise capacity: Differential include infections, acute eosinophilic pneumonia, hypersensitivity pneumonitis and organizing pneumonia.  2.  Dyspnea on exertion  3.  History of coronary artery disease stable  Possible causes of his CT findings are infectious versus inflammatory including but not limited to eosinophilic pneumonia, hypersensitivity pneumonitis and organizing pneumonia.  Patient will need a biopsy for diagnosis.  --Please hold patient's aspirin today as patient will be scheduled for bronchoscopy with transbronchial biopsy on next Tuesday, 6/19/2018.  Our schedules will coordinate and call the patient with details.  --Will get full PFTs prior to bronchoscopy.  --Patient to arrive in bronchoscopy Suite 1 hour prior to his time.  Patient to be n.p.o. After midnight on day prior to his bronchoscopy.  --Patient can be discharged from our perspective.    Seen and discussed with Dr Lenz.     Thor Borges  Pulmonary and Critical Care Fellow   Pager  [phone]    Chief Complaint and History of Present Illness:    CC:  Chronic Cough   HPI: Patient was seen  in the unit on room air.  Patient says that he has been having cough since second week of February which resolved intermittently but is back.  Patient said that he would have bouts of cough that she will last half an hour.  She did not vomit or throw up as a result of these cuffs.  Patient does report that on day prior to his presentation he was dancing for an hour and then got dizzy and lightheaded and was about to pass out.  However he sat down and was able to catch his breath.  His daughter learned of this event and I asked him to come in to get checked out.  Patient does not report any exacerbation of his symptoms.  Patient denies any fevers or sputum production along with his cough.  Patient says that he has history of coronary artery disease and had a bypass done in 2011 at [facility] and his heart disease is stable since.  Patient says that he lives on the [facility] here one floor single-story.  May have some mold in his house.  Patient says that he goes to [facility] every winter for the last 22 years.  Prior to that he went to [place] for 4 years.  Patient says that for the last 4-5 years every time he comes back from [place] he gets this cough.  Usually the cough resolves with treatment with antibiotics by his primary physician.  However this year it has persisted.  Patient got CT chest done in April which did not show any opacities.  Patient also reported that this time he developed bullous lesion on his hands and arms when he went out in the sun.  Patient says that this resolved with over-the-counter anti-histamine medication.  Patient also reports history of elbow swelling which has been treated with arthrocentesis a couple of times.  Patient has follow-up with Dr. Roge Oropeza who prescribed 30 days of Augmentin for his cough on 1 June.  Patient denies smoking.  Works in a  [employer] for the last 50 years.  Still works 2 days in a week.  Patient says that he could walk 3 miles without getting short of breath but now he can hardly walk 1 block since February.  Patient denies any other recreational drug use.  Patient says that he served in [employer] from 4730-1540 in [place].       Review of Systems:  Complete 12 point ROS negative unless mentioned in HPI  Histories, Prior to Admission Medications, Allergies:    Past Medical History:  Past Medical History:   Diagnosis Date     Allergic state      Uncomplicated asthma        Past Surgical History:  History reviewed. No pertinent surgical history.    Past Social History:  Social History     Social History     Marital status:      Spouse name: N/A     Number of children: N/A     Years of education: N/A     Occupational History     Not on file.     Social History Main Topics     Smoking status: Not on file     Smokeless tobacco: Not on file     Alcohol use Not on file     Drug use: Not on file     Sexual activity: Not on file     Other Topics Concern     Not on file     Social History Narrative     No narrative on file       Family History:  Family history negative for ILD, sarcoidosis, lung cancer, rheumatoid arthritis lupus or other connective tissue diseases as per patient's knowledge    Medications:    aspirin  81 mg Oral Daily     diphenhydrAMINE  25 mg Oral At Bedtime     montelukast  10 mg Oral At Bedtime     melatonin, naloxone    Allergies:     Allergies   Allergen Reactions     Bee Pollen Anaphylaxis     Penicillins Nausea and Vomiting     Acetaminophen Unknown     Per pharmacy     Aztreonam Unknown     Per pharmacy     Cephalosporins Unknown     Per pharmacy     Morphine Unknown     Per pharmacy     Oxycodone-Aspirin GI Disturbance     Propoxyphene N-Apap Nausea     Sulfamethoxazole-Trimethoprim Nausea and Vomiting       Physical Exam:    Temp:  [97.3  F (36.3  C)-97.8  F (36.6  C)] 97.3  F (36.3  C)  Pulse:  [55] 55  Heart Rate:   [54-60] 60  Resp:  [16-20] 18  BP: (102-165)/(56-99) 102/56  SpO2:  [93 %-98 %] 93 %    Intake/Output Summary (Last 24 hours) at 06/12/18 1801  Last data filed at 06/12/18 1533   Gross per 24 hour   Intake                0 ml   Output             1895 ml   Net            -1895 ml     General: laying in bed in NAD  HEENT: anicteric, moist mucosa  Neck: no palpable lymphadenopathy, no JVD noted  Chest: CTAB, no wheezing  Cardiac: RRR no murmurs  Abdomen: Soft, flat, non tender, active BS  Extremities: No LE Edema  Neuro: A&Ox3, no focal deficits   Skin: no rash noted    Laboratory, imaging, and microbiologic data:    All laboratory and imaging data reviewed.      Recent Labs  Lab 06/12/18  0631 06/11/18  2351 06/11/18  1559   WBC 6.1 7.6 7.2   HGB 10.8* 11.0* 11.3*   MCV 99 99 100    163 150     --  139   POTASSIUM 4.1  --  4.0   CHLORIDE 106  --  105   CO2 28  --  28   BUN 17  --  22   CR 0.98  --  1.05   ANIONGAP 6  --  6   SERGIO 8.4*  --  8.8   GLC 91  --  99   ALBUMIN  --   --  3.3*   PROTTOTAL  --   --  6.6*   BILITOTAL  --   --  0.2   ALKPHOS  --   --  106   ALT  --   --  60   AST  --   --  56*   TROPI  --   --  <0.015       No lab results found in last 7 days.  Recent Results (from the past 24 hour(s))   CT Chest w Contrast    Narrative    EXAM: CT CHEST W CONTRAST 6/11/2018 6:05 PM    HISTORY:  persistent cough, eval right upper lobe infiltrate;      COMPARISON: Radiographs same day, OSH CT report dated 4/6/2018    TECHNIQUE: Helical acquisition of the chest was obtained with  intravenous contrast and images are displayed at 1 and 5 mm intervals.  Images reviewed in lung, soft tissue, and bone windows.    Contrast: 75 mL Isovue-370    Total DLP: 189 mGy*cm    FINDINGS:   Lines and tubes: None     Lung: There are groundglass opacities in the right upper and middle  lobes with bronchial wall thickening. Scattered areas of groundglass  opacity elsewhere in the right lung and left lung. No pleural  effusion  or pneumothorax.  The trachea and main bronchial tree are patent.  Right hilar lymph node/soft tissue mass measuring approximately 2.6 x  2.4 cm (series 2, image 20) and second hilar masslike soft tissue more  peripherally measuring 2cm (series 2, image 22). Calcified granuloma  in the left upper lobe. Previously described left lower lobe pulmonary  opacities are not visualized in this study.    Mediastinum: Cardiomegaly. No pericardial effusion. Postoperative  changes of CABG. The right main pulmonary artery measuring up to 3.3  cm, the left main pulmonary artery measuring up to 2.9 cm. No central  PE. Atherosclerotic calcification of aortic arch and descending aorta.  The aortic arch and main arterial branches are within normal limits.  Multiple mediastinal and axillary lymph nodes for example 10 mm short  axis precarinal lymph node. Calcified left hilar lymph nodes. The  esophagus is within normal limits.    Limited evaluation of upper abdomen: Small amount of ascites.      Bony structures and subcutaneous tissue: No suspicious bony lesion.  Nondisplaced median sternotomy wires.      Impression    IMPRESSION:   1. Groundglass opacities most notable in the right upper lobe and  middle lobe with bronchial wall thickening and enlarged mediastinal  and right hilar lymph nodes. Differential includes typical or atypical  infection or less likely pulmonary infiltration of CLL. Consider short  term follow-up CT to assess for improvement of right hilar  adenopathy/masses.   2. Multiple prominent mediastinal, retropectoral and axillary lymph  nodes, likely due to  CLL.  3. Previously described left lower lobe nodular and infiltration on  the OS CT report dated 4/6/2018, are resolved.  4. The right main pulmonary artery measuring up to 3.3 cm, the left  main pulmonary artery measuring up to 2.9 cm. These are suggestive of  pulmonary hypertension.    I have personally reviewed the examination and initial  interpretation  and I agree with the findings.    GISELL CUEVAS MD       No flowsheet data found.    Recent Results (from the past 4320 hour(s))   ECHO COMPLETE WITH OPTISON    Narrative    [id number]  [id number]  [id number]  [id number]^MARYA^FRANKIE^SAUL           [facility],[place]  Echocardiography Laboratory  [address] [place] [address]     Name: JORDAN MARTELL  MRN: [id number]  : 1935  Study Date: 2018 10:20 AM  Age: 82 yrs  Gender: Male  Patient Location: [facility]  Reason For Study: SOB  History: SOB  Ordering Physician: FRANKIE MALHOTRA  Performed By: Funmilayo Turner RDCS     BSA: 1.7 m2  Height: 66 in  Weight: 144 lb  BP: 132/71 mmHg  _____________________________________________________________________________  __        Procedure  Complete Portable Echo Adult. Contrast Optison. Optison (NDC #9935-1806-81)  given intravenously. Patient was given 6 ml mixture of 3 ml Optison and 6 ml  saline. 3 ml wasted.  _____________________________________________________________________________  __        Interpretation Summary  Left ventricular function, chamber size, wall motion, and wall thickness are  normal.The EF is 60-65%. No regional wall motion abnormalities are seen. Grade  II or moderate diastolic dysfunction.  RV is difficult to assess but appears with normal size and likely normal  function  Severe aortic valve sclerosis is present. Trace to mild aortic insufficiency  is present. Mild to moderate tricuspid insufficiency is present.  The inferior vena cava was normal in size with preserved respiratory  variability.  No pericardial effusion is present.     Previous study not available for comparison.  _____________________________________________________________________________  __        Left Ventricle  Left ventricular function, chamber size, wall motion, and wall thickness are  normal.The EF is 60-65%. Grade II or moderate diastolic dysfunction. No  regional wall  motion abnormalities are seen.     Right Ventricle  RV is difficult to assess but appears with normal size and likely normal  function.     Atria  Mild left atrial enlargement is present. Mild to moderate right atrial  enlargement is present.        Mitral Valve  Mild mitral annular calcification is present. Trace to mild mitral  insufficiency is present.     Aortic Valve  The aortic valve is tricuspid. Severe aortic valve sclerosis is present. Trace  to mild aortic insufficiency is present.     Tricuspid Valve  The tricuspid valve is normal. Mild to moderate tricuspid insufficiency is  present. The right ventricular systolic pressure is approximated at 21.4 mmHg  plus the right atrial pressure.     Pulmonic Valve  The pulmonic valve is normal.     Vessels  The aorta root is normal. The inferior vena cava was normal in size with  preserved respiratory variability.     Pericardium  No pericardial effusion is present.        Compared to Previous Study  Previous study not available for comparison.  _____________________________________________________________________________  __     MMode/2D Measurements & Calculations  IVSd: 1.1 cm  LVIDd: 4.3 cm  LVIDs: 2.8 cm  LVPWd: 1.1 cm  FS: 33.3 %  LV mass(C)d: 164.8 grams  LV mass(C)dI: 94.7 grams/m2  Ao root diam: 3.0 cm  LA dimension: 4.4 cm  asc Aorta Diam: 2.9 cm  LA/Ao: 1.5  LVOT diam: 2.0 cm  LVOT area: 3.1 cm2  LA Volume (BP): 63.6 ml  LA Volume Index (BP): 36.6 ml/m2     RWT: 0.52        Doppler Measurements & Calculations  MV E max casey: 134.0 cm/sec  MV A max casey: 39.9 cm/sec  MV E/A: 3.4  MV dec time: 0.22 sec  Ao V2 max: 215.5 cm/sec  Ao max P.0 mmHg  Ao V2 mean: 162.5 cm/sec  Ao mean P.0 mmHg  Ao V2 VTI: 47.3 cm  GOPAL(I,D): 2.0 cm2  GOPAL(V,D): 1.8 cm2  LV V1 max P.3 mmHg  LV V1 max: 123.5 cm/sec  LV V1 VTI: 29.6 cm  SV(LVOT): 92.8 ml  SI(LVOT): 53.4 ml/m2  PA acc time: 0.09 sec  TR max casey: 231.5 cm/sec  TR max P.4 mmHg  AV Casey Ratio (DI):  0.57  GOPAL Index (cm2/m2): 1.1  E/E' av.7  Lateral E/e': 15.0  Medial E/e': 40.4        _____________________________________________________________________________  __        Report approved by: FROILAN Amezcua 2018 11:41 AM          Thor Borges MD  Pulmonary Critical Care Fellow   [phone]  ------------------------------------------------------------------------------------------------------------

## 2018-06-12 NOTE — PHARMACY-ADMISSION MEDICATION HISTORY
Admission medication history interview status for the 6/11/2018 admission is complete. See Epic admission navigator for allergy information, pharmacy, prior to admission medications and immunization status.     Medication history interview sources:  [place], patient interview    Changes made to PTA medication list (reason)  Added: None  Deleted: None  Changed:   - changed route to oral instead of oral or J-tube.   - aspirin vs placebo (IDS#5239) --> aspirin 81 mg once daily (looks to be entered in error by nurse. Patient confirms a daily baby aspirin, denies being a part of any drug study or clinical trial, and is not registered as a participant in any trial here)    Additional medication history information (including reliability of information, actions taken by pharmacist):  - Confirms flu shot in October last year  - patient reports he takes tylenol rarely (entered as an allergy)  - confirms N/V weakness to penicillin but can't really speak to other allergies listed.  - He last took his Augmentin yesterday   - he started taking Benadryl for rash he got in [place] a few months ago which resolved and has continued taking 50mg BID for it since.       Prior to Admission medications    Medication Sig Last Dose Taking? Auth Provider   albuterol (PROAIR HFA/PROVENTIL HFA/VENTOLIN HFA) 108 (90 Base) MCG/ACT Inhaler Inhale 1-2 puffs into the lungs every 4 hours as needed   Yes Reported, Patient   amoxicillin-clavulanate (AUGMENTIN) 875-125 MG per tablet Take 1 tablet by mouth 2 times daily  6/11/2018 at Unknown time Yes Reported, Patient   ASPIRIN PO Take 81 mg by mouth daily  Yes Unknown, Entered By History   atropine 1 % ophthalmic solution Place 1 drop into both eyes once daily if needed. 6/11/2018 at Unknown time Yes Reported, Patient   co-enzyme Q-10 100 MG CAPS capsule Take 100 mg by mouth daily   Yes Reported, Patient   diphenhydrAMINE (BENADRYL) 25 MG tablet Take 50 mg by mouth 2 times daily  6/11/2018 at  Unknown time Yes Reported, Patient   montelukast (SINGULAIR) 10 MG tablet Take 10 mg by mouth At Bedtime   Yes Reported, Patient   saw palmetto 450 MG CAPS capsule Take 450 mg by mouth daily   Yes Reported, Patient   nitroGLYcerin (NITROSTAT) 0.4 MG sublingual tablet Place 0.4 mg under the tongue every 5 minutes as needed    Reported, Patient         Medication history completed by: Stalin Guzman, PD4 Pharmacy Student

## 2018-06-12 NOTE — H&P
History and Physical  Duglas Odom MRN: [id number]  Age: 82 year old, : 1935  Primary care provider: No Ref-Primary, Physician           Chief Complaint:     Cough / Fatigue           History of Present Illness:     HISTORY OF PRESENT ILLNESS:  Duglas Odom is a 82 year old with PMH that is pertinent for HTN, HLD , CAD s/p CABG (2011) , PCI x2 () , paroxysmal atrial fibrillation (not on anticoagulation) who is presenting with 3 months history of worsening cough and SOB.    Patient was first seen in [place] on  at that time he was having cough and wheezing in his throat.   Patient was given a 10 day course of antibiotics and prednisone. He didn't have major improvement at that time. He saw his PCP again on 5/10 who started him on 10 day course of Augmentin and , prednisone taper (40-20-10 mg over 9 days).  saw his PCP again on  who started him on a month long coarse of Augmentin for bronchitis/sinusitis.    Patient reports that cough is often productive about 2 table spoons of yellow sputum but it fluctuates. He cant relate anything to the onset of symptoms. It`s associated with increase SOB over the same time period. Prior to february he could walk 3 miles but now can only walk two blocks. Recently he also has been feeling more lightheaded and fatigue - has had 3 episodes so far most recently last night at polka dancing was able to do 1 hour and had to leave and this morning when he was mowing the lawn he felt dizzy and has his neighbor bringing him to the house. His daughter advised him to present to the hospital.     In the ED he was HD stable , HR 54 , T 97.7 , /66 - labs were pertinent for <0.015 troponin -  - Hgb 11.3 - EKG showed NSR with 1st degree AVB - CXR showed possible RUL and CT was obtained and showed GGO . Patient was admitted to cardiology for further evaluation.       ROS:   Denies associated Chest pain , orthopnea, PND, LE edema, fevers, chills  ,  night sweats or weight changes. He has been having trouble sleeping but unsure why.     Travel Hx:   [place] every year between November and feburary     Contacts:   No sick contacts  No Pets   Potential Mold exposure             Past Medical History:     Reviewed with patient on 06/11/2018   Paroxysmal A.fib  Anxiety  Asthma  BCC  CAD s/p CABG & PCI x2           Past Surgical History:      CATARACT REMOVAL 10/2006   Bilateral     CORONARY ARTERY BYPASS GRAFT 2011   4 vessel     CORONARY STENT PLACEMENT 2004   2 stents --[place]     excision of lesion left infraocular with full thickness skin graft 09/2012     HERNIA REPAIR 1998     HERNIA REPAIR 9/2001     Minimally invasive right L4-5 discectomy 2008     Right canalplasty with removal of exostoses x 3 and 7/2012     ROTATOR CUFF REPAIR 1991   Right Shoulder           Social History:      - lives alone  Lives in [place] between Nov - Feb  Worked as water police   Non smoker  1-3 alcohol a week          Family History:     Wasn't reviewed           Allergies:     Allergies   Allergen Reactions     Bee Pollen Anaphylaxis     Penicillins Nausea and Vomiting     Acetaminophen Unknown     Per pharmacy     Aztreonam Unknown     Per pharmacy     Cephalosporins Unknown     Per pharmacy     Morphine Unknown     Per pharmacy     Oxycodone-Aspirin GI Disturbance     Propoxyphene N-Apap Nausea     Sulfamethoxazole-Trimethoprim Nausea and Vomiting                Medications:     PTA Meds  Prior to Admission medications    Medication Sig Last Dose Taking? Auth Provider   albuterol (PROAIR HFA/PROVENTIL HFA/VENTOLIN HFA) 108 (90 Base) MCG/ACT Inhaler Inhale 1-2 puffs into the lungs  Yes Reported, Patient   amoxicillin-clavulanate (AUGMENTIN) 875-125 MG per tablet 1 tablet by Oral or J tube route 6/11/2018 at Unknown time Yes Reported, Patient   co-enzyme Q-10 100 MG CAPS capsule 100 mg by Oral or J tube route  Yes Reported, Patient   montelukast (SINGULAIR) 10 MG tablet 10 mg  "by Oral or J tube route  Yes Reported, Patient   nitroGLYcerin (NITROSTAT) 0.4 MG sublingual tablet Place 0.4 mg under the tongue  Yes Reported, Patient   saw palmetto 450 MG CAPS capsule 450 mg by Oral or J tube route  Yes Reported, Patient   study - aspirin vs placebo (IDS #5239) 1 tablet tablet 81 mg by Oral or J tube route  Yes Reported, Patient   atropine 1 % ophthalmic solution Place 1 drop into both eyes once daily if needed.   Reported, Patient   diphenhydrAMINE (BENADRYL) 25 MG tablet 25 mg by Oral or J tube route   Reported, Patient      ALLERGIES:    Allergies   Allergen Reactions     Bee Pollen Anaphylaxis     Penicillins Nausea and Vomiting     Acetaminophen Unknown     Per pharmacy     Aztreonam Unknown     Per pharmacy     Cephalosporins Unknown     Per pharmacy     Morphine Unknown     Per pharmacy     Oxycodone-Aspirin GI Disturbance     Propoxyphene N-Apap Nausea     Sulfamethoxazole-Trimethoprim Nausea and Vomiting                     Physical Exam:     /76 (BP Location: Left arm)  Temp 97.8  F (36.6  C) (Oral)  Resp 17  Ht 1.676 m (5' 6\")  Wt 69 kg (152 lb 1.6 oz)  SpO2 95%  BMI 24.55 kg/m2    Wt Readings from Last 4 Encounters:   06/11/18 68.9 kg (152 lb)       No intake or output data in the 24 hours ending 06/11/18 2003    General: NAD, resting comfortably on exam, appears stated age,pleasant and cooperative, AAOx3.  HEENT: NC/AT, well injected Conjunctiva, anicteric sclera, EOMI; PERRL, MMM  Neck: Trachea midline; supple, good tone; full ROM; negative for Lymphoadenopathy, JVD to jaw  Chest: bibasilar faint rales heard bilaterally   CV: RRR; nl S1/S2, 2/6 systolic CD murmurs in RUSB   Abd: Soft, NT/ND, no HSM, (+) BS;   Ext: Warm/well perfused; no Edema.  Skin: Clear; unbroken; macular rash on face   Neuro: - MS: AAO x3 - no focal deficit             Data:       DIAGNOSTIC STUDIES  ROUTINE ICU LABS (Last four results)  CMP  Recent Labs  Lab 06/11/18  1559      POTASSIUM 4.0 "   CHLORIDE 105   CO2 28   ANIONGAP 6   GLC 99   BUN 22   CR 1.05   GFRESTIMATED 68   GFRESTBLACK 82   SERGIO 8.8   PROTTOTAL 6.6*   ALBUMIN 3.3*   BILITOTAL 0.2   ALKPHOS 106   AST 56*   ALT 60     CBC  Recent Labs  Lab 06/11/18  1559   WBC 7.2   RBC 3.35*   HGB 11.3*   HCT 33.5*      MCH 33.7*   MCHC 33.7   RDW 14.3        INRNo lab results found in last 7 days.  Arterial Blood GasNo lab results found in last 7 days.    MICROBIOLOGY      IMAGING  CT chest 6/11:   1. Groundglass opacities most notable in the right upper lobe and  middle lobe with bronchial wall thickening and enlarged mediastinal  and right hilar lymph nodes. Differential includes typical or atypical  infection or less likely pulmonary infiltration of CLL. Consider short  term follow-up CT to assess for improvement of right hilar  adenopathy/masses.   2. Multiple prominent mediastinal, retropectoral and axillary lymph  nodes, likely due to  CLL.  3. Previously described left lower lobe nodular and infiltration on  the [facility] CT report dated 4/6/2018, are resolved.  4. The right main pulmonary artery measuring up to 3.3 cm, the left  main pulmonary artery measuring up to 2.9 cm. These are suggestive of  pulmonary hypertension.    Echo  NM myocardial perfusion 7/2016:  3. There were no significant symptoms with stress.  4. The stress ECG was negative for ischemia.  5. Myocardial perfusion was normal.  6. Overall left ventricular systolic function was normal without regional   wall motion abnormalities.  The post-stress LVEF was calculated to be 73%.   7. Left ventricular cavity size was normal ( ml).  8. Compared to the prior study from 5/3/13, the current study was   unchanged.    Assessment and Plan:     Duglas Odom is a 82 year old with PMH that is pertinent for HTN, HLD , CAD s/p CABG (4v 2011) , PCI x2 (2004) , paroxysmal atrial fibrillation ?(not on anticoagulation) who is presenting with 3 months history of worsening cough  and SOB.    #Chronic Cough / SOB  #Diffuse GGO   Exam and lab work consistent with HF (JVP to jaw , IVC >2.1 cm with inspiratory variation,elevated BNP) leading to pulmonary edema. His last EF (2016 73%) - he does have RF for HFpEF including CAD, HTN.   He also may have aortic stenosis (2/6 systolic murmur in aortic area) leading to HF.     Will treat with IV diuresis and plan for stress TTE in the AM.     - Lasix 20 mg IV   - Plan for stress TTE in the AM     #Medistinal lymphadenopathy  #Diffuse GGO   Presentation c/w cardiac however new lymphadenopathy (compared to CT scan in April) is concerning for superimposed etiology.  DDx Infectious : Atypical lung infection (Legionella, Mycoplasma, NTMB) however no fevers, no wbc , and negative pro-calcitonin. Could be resolving infection.   Pulmonary : NSIP , HSP , ABPA (history of mold contact).   Malignant : CLL , lymphoma     Will do basic work up for potential etiologies   - CRP/ESR   - Legionella Uag / Mycoplasma serology / AFB culture   - Peripheral blood smear   - Will need follow up CT in 3-6 months (consider outpatient pulm consult)     #CAD s/p 4v CABG 2011 (venous graft)   No history of active chest pain however given decreased exercise tolerance and history of venous graft for CABG will obtain stress echocardiogram in the AM.     - Aspirin 81 mg qday    - BB : stress study, will hold off   - Statin: AM lipid panel to calculate ASCVD     #Paroxysmal A.fib ?   Documented in several notes including cardiology notes from OSH. No documented EKG evidence of A.fib . Will place on Tele , he may need to be discharged on Zio-patch to r/o a.fib given high risk of stroke.  CHADSVASC score 4.     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   He saw an oncologist on 4/2 who thought this could be related to resolving infection.     - Repeat PBS     #Normocytic (borderline macrocytic) Anemia , Chronic   At baseline , unclear etiology. DDx MDS , combined iron and b12 deficiency   - PBS / Reticulocyte count in the AM   - Consider further work up b12, iron , folate , MMA , etc...    #HTN : Not on BB medication - monitor inpatient may need ACE/CCB   #HLD: Lipid panel in the AM   #Asthma/Hives:  Continue PTA Benadryl / singular     Prophylaxis:  DVT: Ambulate GI: None   Family: Not updated  Disposition: Pending work up 1-3 days   Code Status: DNR - okay with intubation.     Patient to be staffed in the AM.     Gavino Brownlee MD  Internal Medicine, PGY-2  Pager [phone]    Staff Physician Comments:     I personally interviewed and examined Duglas Odom next day or 6/12/18, and agree with cardiology fellows/residents assessment and plan as documented above. Please see progress note from 6/12/18.       Mandy Valdovinos MD     Division of Cardiology  [facility] [place] [address]    Clinic nurse:  Shahzad Watters LPN   Nurse Care Coordinator- Heart Care   [phone] option 1, than option 3    [phone] Appointments  [phone] Fax  [phone] After hours

## 2018-06-12 NOTE — PLAN OF CARE
Problem: Patient Care Overview  Goal: Plan of Care/Patient Progress Review  Outcome: No Change    D: Admitted 6/11/18 for Shortness of Breath. Hx HTN, HLD, CAD, CABG, a-fib.    I/A: SR/Sinus flex 50s-60s, otherwise VSSA, on room air. Denies pain. Adequate  UO. Right PIV, saline locked. Sleeping between cares.    P: Continue to monitor pt. Collect sputum sample. Exercise stress test this AM. Notify Cards 1 with pertinent changes.

## 2018-06-12 NOTE — PLAN OF CARE
Problem: Cardiac Disease Comorbidity  Goal: Cardiac Disease  Patient comorbidity will be monitored for signs and symptoms of Cardiac Disease.  Problems will be absent, minimized or managed by discharge/transition of care.   Outcome: No Change  Focus:  Admission  Diagnosis:   Admitted from: [facility]   Via: stretcher   Accompanied by: family.  Belongings: Placed in closet; valuables sent home with family.   Admission paperwork: complete.   Teaching: Call don't fall, use of console, meal times, visiting hours, orientation to unit, when to call for the RN (angina/sob/dizzyness, etc.), and stressed the importance of safety.   Access: PIV   Telemetry: Placed on pt   Ht./Wt.: complete

## 2018-06-13 ENCOUNTER — CARE COORDINATION (OUTPATIENT)
Dept: CARE COORDINATION | Facility: CLINIC | Age: 83
End: 2018-06-13

## 2018-06-13 ENCOUNTER — TELEPHONE (OUTPATIENT)
Dept: PULMONOLOGY | Facility: CLINIC | Age: 83
End: 2018-06-13

## 2018-06-13 DIAGNOSIS — R91.8 PULMONARY NODULES: Primary | ICD-10-CM

## 2018-06-13 DIAGNOSIS — R06.02 SOB (SHORTNESS OF BREATH): Primary | ICD-10-CM

## 2018-06-13 LAB
L PNEUMO1 AG UR QL IA: NORMAL
SPECIMEN SOURCE: NORMAL

## 2018-06-13 NOTE — TELEPHONE ENCOUNTER
Contacted to prep patient for bronchoscopy scheduled 6/19/18 at 12 pm with check in at 11 am on first floor of hospital in endoscopy. Instructions include: must have , no food and fluids after midnight, stop ASA today, allow 4 hrs total for entire procedure, no talking during exam, will have throat numbed and given IV sedation, tell nurse if having chest pain, SOB and more then flecks of blood with cough, risk factors given, results begin to return with in the week but some results can take 6-8 weeks, patient will hold all meds the day of exam until after procedure, patient knows to have PFT at [facility] at 9 am the same day as bronchoscopy and has clinic number to call with questions.

## 2018-06-14 LAB
M PNEUMO IGG SER IA-ACNC: 1 U/L
M PNEUMO IGM SER IA-ACNC: 0.24 U/L

## 2018-06-19 ENCOUNTER — HOSPITAL ENCOUNTER (OUTPATIENT)
Facility: CLINIC | Age: 83
Discharge: HOME | End: 2018-06-19
Attending: INTERNAL MEDICINE | Admitting: INTERNAL MEDICINE
Payer: MEDICARE

## 2018-06-19 ENCOUNTER — SURGERY (OUTPATIENT)
Age: 83
End: 2018-06-19

## 2018-06-19 ENCOUNTER — APPOINTMENT (OUTPATIENT)
Dept: GENERAL RADIOLOGY | Facility: CLINIC | Age: 83
End: 2018-06-19
Attending: INTERNAL MEDICINE
Payer: MEDICARE

## 2018-06-19 VITALS
RESPIRATION RATE: 18 BRPM | OXYGEN SATURATION: 97 % | SYSTOLIC BLOOD PRESSURE: 126 MMHG | DIASTOLIC BLOOD PRESSURE: 79 MMHG

## 2018-06-19 DIAGNOSIS — R06.02 SOB (SHORTNESS OF BREATH): ICD-10-CM

## 2018-06-19 PROCEDURE — 93005 ELECTROCARDIOGRAM TRACING: CPT

## 2018-06-19 PROCEDURE — 71046 X-RAY EXAM CHEST 2 VIEWS: CPT

## 2018-06-19 PROCEDURE — 40000881 ZZH CANCELLED SURGERY UP TO 31-45 MINS: Performed by: INTERNAL MEDICINE

## 2018-06-19 NOTE — OR NURSING
Pt brought to procedure room per pulm fellow.  After attending MD saw pt, decided to cancel procedure d/t new atrial flutter.  Pt stated he was intermittently lightheaded but denied other symptoms.  MD also ordered chest xray, pt directed to [facility] waiting room after d/c.

## 2018-06-20 LAB — INTERPRETATION ECG - MUSE: NORMAL

## 2018-08-01 LAB
DLCOCOR-%PRED-PRE: 107 %
DLCOCOR-PRE: 24.12 ML/MIN/MMHG
DLCOUNC-%PRED-PRE: 94 %
DLCOUNC-PRE: 21.07 ML/MIN/MMHG
DLCOUNC-PRED: 22.4 ML/MIN/MMHG
ERV-%PRED-PRE: 66 %
ERV-PRE: 0.66 L
ERV-PRED: 0.99 L
EXPTIME-PRE: 7.66 SEC
FEF2575-%PRED-PRE: 103 %
FEF2575-PRE: 1.92 L/SEC
FEF2575-PRED: 1.86 L/SEC
FEFMAX-%PRED-PRE: 105 %
FEFMAX-PRE: 6.8 L/SEC
FEFMAX-PRED: 6.46 L/SEC
FEV1-%PRED-PRE: 81 %
FEV1-PRE: 2.11 L
FEV1FEV6-PRE: 80 %
FEV1FEV6-PRED: 76 %
FEV1FVC-PRE: 80 %
FEV1FVC-PRED: 71 %
FEV1SVC-PRE: 79 %
FEV1SVC-PRED: 65 %
FIFMAX-PRE: 4.7 L/SEC
FRCPLETH-%PRED-PRE: 79 %
FRCPLETH-PRE: 2.88 L
FRCPLETH-PRED: 3.62 L
FVC-%PRED-PRE: 75 %
FVC-PRE: 2.64 L
FVC-PRED: 3.48 L
IC-%PRED-PRE: 67 %
IC-PRE: 2.02 L
IC-PRED: 3 L
RVPLETH-%PRED-PRE: 79 %
RVPLETH-PRE: 2.21 L
RVPLETH-PRED: 2.78 L
TLCPLETH-%PRED-PRE: 75 %
TLCPLETH-PRE: 4.89 L
TLCPLETH-PRED: 6.52 L
VA-%PRED-PRE: 62 %
VA-PRE: 3.87 L
VC-%PRED-PRE: 67 %
VC-PRE: 2.68 L
VC-PRED: 3.99 L

## 2019-01-28 NOTE — PROGRESS NOTES
Pt was planned to have a bronchoscopy but was noted to have atrial flutter on telemetry. EKG showed 4:1 block with a flutter. Previous EKG showed sinus bradycardia with first-degree heart block.    On further evaluation patient was noted to have 1 EKG with a flutter in 2011 in care everywhere.  Patient also reported a history of on and off palpitations when he would exert himself sometimes.  Patient denies any history of abnormal heart rhythm.  However patient's wife reported that he had issues with his heart rhythm and had Holter monitoring in the past.  Given his atrial flutter is bronchoscopy was canceled.    Plan:   --We will set up appointment to see patient in pulmonary clinic in 1-2 weeks with repeat CT chest without contrast.  If patient's symptoms and imaging is improved will defer doing a bronchoscopy.    Discussed with staff Dr. Dickey   Yes

## 2022-01-30 NOTE — PROGRESS NOTES
Removed PIV- Told pt to wait in room until he signed his d/c paperwork then he could leave. Went to print off d/c paperwork and pt walked out without signing d/c papers. Tried to find pt in elevators   Walking

## (undated) RX ORDER — LIDOCAINE HYDROCHLORIDE 10 MG/ML
INJECTION, SOLUTION EPIDURAL; INFILTRATION; INTRACAUDAL; PERINEURAL
Status: DISPENSED
Start: 2018-06-19

## (undated) RX ORDER — ALBUTEROL SULFATE 0.83 MG/ML
SOLUTION RESPIRATORY (INHALATION)
Status: DISPENSED
Start: 2018-06-19

## (undated) RX ORDER — FENTANYL CITRATE 50 UG/ML
INJECTION, SOLUTION INTRAMUSCULAR; INTRAVENOUS
Status: DISPENSED
Start: 2018-06-19

## (undated) RX ORDER — LIDOCAINE HYDROCHLORIDE AND EPINEPHRINE 10; 10 MG/ML; UG/ML
INJECTION, SOLUTION INFILTRATION; PERINEURAL
Status: DISPENSED
Start: 2018-06-19